# Patient Record
Sex: MALE | Race: WHITE | NOT HISPANIC OR LATINO | Employment: OTHER | URBAN - METROPOLITAN AREA
[De-identification: names, ages, dates, MRNs, and addresses within clinical notes are randomized per-mention and may not be internally consistent; named-entity substitution may affect disease eponyms.]

---

## 2020-02-18 ASSESSMENT — MIFFLIN-ST. JEOR: SCORE: 1483.71

## 2020-02-20 ENCOUNTER — ANESTHESIA - HEALTHEAST (OUTPATIENT)
Dept: SURGERY | Facility: AMBULATORY SURGERY CENTER | Age: 80
End: 2020-02-20

## 2020-02-21 ENCOUNTER — SURGERY - HEALTHEAST (OUTPATIENT)
Dept: SURGERY | Facility: AMBULATORY SURGERY CENTER | Age: 80
End: 2020-02-21

## 2020-02-21 ASSESSMENT — MIFFLIN-ST. JEOR: SCORE: 1483.71

## 2021-06-04 VITALS — HEIGHT: 67 IN | WEIGHT: 183 LBS | BODY MASS INDEX: 28.72 KG/M2

## 2021-06-06 NOTE — ANESTHESIA CARE TRANSFER NOTE
Last vitals:   Vitals:    02/21/20 0940   BP:    Pulse: (!) (P) 58   Resp: (P) 14   Temp: (P) 37.4  C (99.4  F)   SpO2: (P) 99%     /68  Pt brought to phase 2 on 6L O2. Monitors applied. VSS.    Patient's level of consciousness is drowsy  Spontaneous respirations: yes  Maintains airway independently: yes  Dentition unchanged: yes  Oropharynx: oropharynx clear of all foreign objects    QCDR Measures:  ASA# 20 - Surgical Safety Checklist: WHO surgical safety checklist completed prior to induction    PQRS# 430 - Adult PONV Prevention: NA - Not adult patient, not GA or 3 or more risk factors NOT present  ASA# 8 - Peds PONV Prevention: NA - Not pediatric patient, not GA or 2 or more risk factors NOT present  PQRS# 424 - Tabitha-op Temp Management: NA - MAC anesthesia or case < 60 minutes  PQRS# 426 - PACU Transfer Protocol: - Transfer of care checklist used  ASA# 14 - Acute Post-op Pain: ASA14B - Patient did NOT experience pain >= 7 out of 10

## 2021-06-06 NOTE — ANESTHESIA PREPROCEDURE EVALUATION
Anesthesia Evaluation      Patient summary reviewed   No history of anesthetic complications     Airway   Mallampati: II  Neck ROM: full   Pulmonary - normal exam    breath sounds clear to auscultation  (+) sleep apnea on CPAP, , a smoker (remote)                         Cardiovascular - normal exam  Exercise tolerance: > or = 4 METS  (+) hypertension well controlled, ,      Neuro/Psych - negative ROS     Endo/Other    (+) diabetes mellitus (no meds, A1: 6.4) type 2 well controlled,      GI/Hepatic/Renal    (+)   chronic renal disease (nephrolithiasis),           Dental - normal exam   (+) caps                       Anesthesia Plan  Planned anesthetic: MAC  Propofol infusion  Patient interested in watching colonoscopy, as he has in the past.     ASA 3   Induction: intravenous   Anesthetic plan and risks discussed with: patient, spouse and sibling  Anesthesia plan special considerations: antiemetics,   Post-op plan: routine recovery      Results for orders placed or performed during the hospital encounter of 02/21/20   Bedside Glucose POCT   Result Value Ref Range    Glucose,  (!) 70 - 125 mg/dL     Hgb 16.3  K 3.9  Cr 1.17

## 2021-06-06 NOTE — ANESTHESIA POSTPROCEDURE EVALUATION
Patient: Dada Mccoy  Procedure(s):  COLONOSCOPY  Anesthesia type: MAC    Patient location: Phase II Recovery  Last vitals:   Vitals Value Taken Time   /61 2/21/2020 10:03 AM   Temp  2/21/2020 10:16 AM   Pulse 78 2/21/2020 10:13 AM   Resp 16 2/21/2020 10:00 AM   SpO2 95 % 2/21/2020 10:13 AM   Vitals shown include unvalidated device data.  Post vital signs: stable  Level of consciousness: awake and responds to simple questions  Post-anesthesia pain: pain controlled  Post-anesthesia nausea and vomiting: no  Pulmonary: unassisted, return to baseline  Cardiovascular: stable and blood pressure at baseline  Hydration: adequate  Anesthetic events: no    QCDR Measures:  ASA# 11 - Tabitha-op Cardiac Arrest: ASA11B - Patient did NOT experience unanticipated cardiac arrest  ASA# 12 - Tabitha-op Mortality Rate: ASA12B - Patient did NOT die  ASA# 13 - PACU Re-Intubation Rate: NA - No ETT / LMA used for case  ASA# 10 - Composite Anes Safety: ASA10A - No serious adverse event    Additional Notes:

## 2021-08-16 ENCOUNTER — HOSPITAL ENCOUNTER (OUTPATIENT)
Facility: CLINIC | Age: 81
End: 2021-08-16
Attending: INTERNAL MEDICINE | Admitting: INTERNAL MEDICINE

## 2021-08-21 ENCOUNTER — HEALTH MAINTENANCE LETTER (OUTPATIENT)
Age: 81
End: 2021-08-21

## 2021-10-01 DIAGNOSIS — Z11.59 ENCOUNTER FOR SCREENING FOR OTHER VIRAL DISEASES: ICD-10-CM

## 2021-10-16 ENCOUNTER — HEALTH MAINTENANCE LETTER (OUTPATIENT)
Age: 81
End: 2021-10-16

## 2022-10-01 ENCOUNTER — HEALTH MAINTENANCE LETTER (OUTPATIENT)
Age: 82
End: 2022-10-01

## 2023-10-15 ENCOUNTER — HEALTH MAINTENANCE LETTER (OUTPATIENT)
Age: 83
End: 2023-10-15

## 2024-01-01 ENCOUNTER — APPOINTMENT (OUTPATIENT)
Dept: CT IMAGING | Facility: HOSPITAL | Age: 84
End: 2024-01-01
Attending: EMERGENCY MEDICINE
Payer: COMMERCIAL

## 2024-01-01 ENCOUNTER — HOSPITAL ENCOUNTER (EMERGENCY)
Facility: HOSPITAL | Age: 84
Discharge: HOME | End: 2024-01-01
Attending: EMERGENCY MEDICINE | Admitting: EMERGENCY MEDICINE
Payer: COMMERCIAL

## 2024-01-01 VITALS
HEART RATE: 89 BPM | WEIGHT: 163 LBS | BODY MASS INDEX: 25.58 KG/M2 | DIASTOLIC BLOOD PRESSURE: 67 MMHG | SYSTOLIC BLOOD PRESSURE: 133 MMHG | TEMPERATURE: 97.7 F | OXYGEN SATURATION: 98 % | RESPIRATION RATE: 16 BRPM | HEIGHT: 67 IN

## 2024-01-01 DIAGNOSIS — K59.00 CONSTIPATION, UNSPECIFIED CONSTIPATION TYPE: ICD-10-CM

## 2024-01-01 DIAGNOSIS — R10.32 LEFT LOWER QUADRANT ABDOMINAL PAIN: ICD-10-CM

## 2024-01-01 LAB
ALBUMIN SERPL BCG-MCNC: 4.2 G/DL (ref 3.5–5.2)
ALBUMIN UR-MCNC: NEGATIVE MG/DL
ALP SERPL-CCNC: 66 U/L (ref 40–150)
ALT SERPL W P-5'-P-CCNC: 12 U/L (ref 0–70)
ANION GAP SERPL CALCULATED.3IONS-SCNC: 9 MMOL/L (ref 7–15)
APPEARANCE UR: CLEAR
AST SERPL W P-5'-P-CCNC: 16 U/L (ref 0–45)
BASOPHILS # BLD AUTO: 0 10E3/UL (ref 0–0.2)
BASOPHILS NFR BLD AUTO: 0 %
BILIRUB SERPL-MCNC: 0.2 MG/DL
BILIRUB UR QL STRIP: NEGATIVE
BUN SERPL-MCNC: 17.9 MG/DL (ref 8–23)
CALCIUM SERPL-MCNC: 8.6 MG/DL (ref 8.8–10.2)
CHLORIDE SERPL-SCNC: 103 MMOL/L (ref 98–107)
COLOR UR AUTO: COLORLESS
CREAT SERPL-MCNC: 0.83 MG/DL (ref 0.67–1.17)
DEPRECATED HCO3 PLAS-SCNC: 29 MMOL/L (ref 22–29)
EGFRCR SERPLBLD CKD-EPI 2021: 86 ML/MIN/1.73M2
EOSINOPHIL # BLD AUTO: 0.1 10E3/UL (ref 0–0.7)
EOSINOPHIL NFR BLD AUTO: 1 %
ERYTHROCYTE [DISTWIDTH] IN BLOOD BY AUTOMATED COUNT: 12.4 % (ref 10–15)
GLUCOSE SERPL-MCNC: 159 MG/DL (ref 70–99)
GLUCOSE UR STRIP-MCNC: NEGATIVE MG/DL
HCT VFR BLD AUTO: 43.9 % (ref 40–53)
HGB BLD-MCNC: 14.8 G/DL (ref 13.3–17.7)
HGB UR QL STRIP: NEGATIVE
IMM GRANULOCYTES # BLD: 0 10E3/UL
IMM GRANULOCYTES NFR BLD: 1 %
KETONES UR STRIP-MCNC: 20 MG/DL
LEUKOCYTE ESTERASE UR QL STRIP: NEGATIVE
LYMPHOCYTES # BLD AUTO: 0.7 10E3/UL (ref 0.8–5.3)
LYMPHOCYTES NFR BLD AUTO: 12 %
MCH RBC QN AUTO: 30.8 PG (ref 26.5–33)
MCHC RBC AUTO-ENTMCNC: 33.7 G/DL (ref 31.5–36.5)
MCV RBC AUTO: 91 FL (ref 78–100)
MONOCYTES # BLD AUTO: 0.5 10E3/UL (ref 0–1.3)
MONOCYTES NFR BLD AUTO: 8 %
MUCOUS THREADS #/AREA URNS LPF: PRESENT /LPF
NEUTROPHILS # BLD AUTO: 5.1 10E3/UL (ref 1.6–8.3)
NEUTROPHILS NFR BLD AUTO: 78 %
NITRATE UR QL: NEGATIVE
NRBC # BLD AUTO: 0 10E3/UL
NRBC BLD AUTO-RTO: 0 /100
PH UR STRIP: 6.5 [PH] (ref 5–7)
PLATELET # BLD AUTO: 228 10E3/UL (ref 150–450)
POTASSIUM SERPL-SCNC: 3.5 MMOL/L (ref 3.4–5.3)
PROT SERPL-MCNC: 6.6 G/DL (ref 6.4–8.3)
RBC # BLD AUTO: 4.81 10E6/UL (ref 4.4–5.9)
RBC URINE: <1 /HPF
SODIUM SERPL-SCNC: 141 MMOL/L (ref 135–145)
SP GR UR STRIP: 1.02 (ref 1–1.03)
SQUAMOUS EPITHELIAL: <1 /HPF
UROBILINOGEN UR STRIP-MCNC: <2 MG/DL
WBC # BLD AUTO: 6.4 10E3/UL (ref 4–11)
WBC URINE: 0 /HPF

## 2024-01-01 PROCEDURE — 74177 CT ABD & PELVIS W/CONTRAST: CPT | Mod: MA

## 2024-01-01 PROCEDURE — 36415 COLL VENOUS BLD VENIPUNCTURE: CPT | Performed by: EMERGENCY MEDICINE

## 2024-01-01 PROCEDURE — 250N000013 HC RX MED GY IP 250 OP 250 PS 637: Performed by: EMERGENCY MEDICINE

## 2024-01-01 PROCEDURE — 99285 EMERGENCY DEPT VISIT HI MDM: CPT | Mod: 25

## 2024-01-01 PROCEDURE — 85025 COMPLETE CBC W/AUTO DIFF WBC: CPT | Performed by: EMERGENCY MEDICINE

## 2024-01-01 PROCEDURE — 80053 COMPREHEN METABOLIC PANEL: CPT | Performed by: EMERGENCY MEDICINE

## 2024-01-01 PROCEDURE — 250N000011 HC RX IP 250 OP 636: Performed by: EMERGENCY MEDICINE

## 2024-01-01 PROCEDURE — 81001 URINALYSIS AUTO W/SCOPE: CPT | Performed by: EMERGENCY MEDICINE

## 2024-01-01 RX ORDER — IOPAMIDOL 755 MG/ML
80 INJECTION, SOLUTION INTRAVASCULAR ONCE
Status: COMPLETED | OUTPATIENT
Start: 2024-01-01 | End: 2024-01-01

## 2024-01-01 RX ORDER — ACETAMINOPHEN 325 MG/1
975 TABLET ORAL ONCE
Status: COMPLETED | OUTPATIENT
Start: 2024-01-01 | End: 2024-01-01

## 2024-01-01 RX ADMIN — ACETAMINOPHEN 975 MG: 325 TABLET ORAL at 09:18

## 2024-01-01 RX ADMIN — MINERAL OIL 226 ML: 1000 LIQUID ORAL at 09:46

## 2024-01-01 RX ADMIN — IOPAMIDOL 80 ML: 755 INJECTION, SOLUTION INTRAVENOUS at 08:21

## 2024-01-01 ASSESSMENT — ENCOUNTER SYMPTOMS
DYSURIA: 0
COUGH: 0
DIFFICULTY URINATING: 0
DIARRHEA: 0
NAUSEA: 0
WEAKNESS: 0
SHORTNESS OF BREATH: 0
CONSTIPATION: 0
FEVER: 0
VOMITING: 0
FATIGUE: 0
HEADACHES: 0
ABDOMINAL PAIN: 1
CHILLS: 0

## 2024-01-01 ASSESSMENT — ACTIVITIES OF DAILY LIVING (ADL)
ADLS_ACUITY_SCORE: 35
ADLS_ACUITY_SCORE: 35

## 2024-01-01 NOTE — ED PROVIDER NOTES
EMERGENCY DEPARTMENT ENCOUNTER      NAME: Dada Mccoy  AGE: 83 year old male  YOB: 1940  MRN: [id number]  EVALUATION DATE & TIME: 1/1/2024  6:57 AM    PCP: No primary care provider on file.    ED PROVIDER: Jerri Del Toro DO      Chief Complaint   Patient presents with    Abdominal Pain         FINAL IMPRESSION:  1. Left lower quadrant abdominal pain    2. Constipation, unspecified constipation type          ED COURSE & MEDICAL DECISION MAKING:    Pertinent Labs & Imaging studies reviewed. (See chart for details)  7:05 AM I met the patient and performed my initial interview and exam.  9:10 AM Rechecked and updated the patient. His abdominal pain is now more centralized. His repeat abdominal exam is benign. He also reports he has been having trouble with bowel movements recently. He took MiraLAX yesterday and had a large bowel movement, but hasn't taken any MiraLAX yet today.   9:20 AM Offered patient an enema, he would like this.  10:43 AM Update from ED RN. Patient able to have a bowel movement after enema. Feeling improved and feels ready to go home. We discussed the plan for discharge and the patient is agreeable. Reviewed supportive cares, symptomatic treatment, outpatient follow up, and reasons to return to the Emergency Department. Patient to be discharged by ED RN.     83 year old male presents to the Emergency Department for evaluation of left lower quadrant abdominal pain.    MEDICAL DECISION MAKING: I was concerned about this patient's LOWER ABDOMINAL pain and considered multiple causes including but not limited to the following.        Intestinal Ischemia:  Patient does not have significant risk factors for intestinal ischemia.  Pain is not out of proportion to exam findings.  Transverse colitis / Diverticulitis: Patient does not have diarrhea or fevers.   Appendicitis: Patient does not have RLQ TTP.    Pyelonephritis: Urine is not consistent with infection  Ureterolithiasis/Renal  Colic:  Urine does not show blood.   Bowel Obstruction:  Patient is  passing gas. Bowel sounds are  normal.   Others benign causes including: IBS, intestinal colic / gas pains, etc      IMPRESSION: Based on this patient's history, exam, and diagnostic results, the working diagnosis of this patient's abdominal pain is likely due to large amount of stool in colon and rectum.        DISPOSITION PLAN:    Discharge. Patient is appropriate for outpatient management with medications per discharge instructions.  He was given an enema today and he had a bowel movement.  Patient was given verbal and written discharge instructions for follow up as well as indications for return to the Emergency Department.     At the conclusion of the encounter I discussed the results of all of the tests and the disposition. The questions were answered. The patient or family acknowledged understanding and was agreeable with the care plan.     Medical Decision Making    History:  Supplemental history from: N/A  External Record(s) reviewed: Outpatient Record: Internal Medicine Visit 12/11/23    Work Up:  Chart documentation includes differential considered and any EKGs or imaging independently interpreted by provider, where specified.  In additional to work up documented, I considered the following work up: Documented in chart, if applicable.    External consultation:  Discussion of management with another provider: Documented in chart, if applicable    Complicating factors:  Care impacted by chronic illness: Diabetes and Hypertension  Care affected by social determinants of health: N/A    Disposition considerations: Discharge. No recommendations on prescription strength medication(s). See documentation for any additional details.    MEDICATIONS GIVEN IN THE EMERGENCY:  Medications   iopamidol (ISOVUE-370) solution 80 mL (80 mLs Intravenous $Given 1/1/24 6825)   acetaminophen (TYLENOL) tablet 975 mg (975 mg Oral $Given 1/1/24 0903)   Enema  Compound (docusate/mineral oil/NaPhos) NO MAG CIT PREMIX (226 mLs Rectal $Given 1/1/24 6788)       NEW PRESCRIPTIONS STARTED AT TODAY'S ER VISIT  New Prescriptions    No medications on file          =================================================================    HPI    Patient information was obtained from: Patient    Use of : N/A      Dada Mccoy is a 83 year old male with a pertinent history of HTN, diabetes mellitus type II, kidney stones, and colon cancer s/p partial resection (2007) who presents to this ED via EMS for evaluation of abdominal pain.    The patient is coming from home where he lives independently. He woke up a little before 0500 this morning. Shortly after waking up he had onset of LLQ abdominal pain. The pain has been constant since onset with waxing and waning intensity. The pain is non-radiating. He denies any history of similar pain. He was able to have a small bowel movement this morning without difficulty. He has not had any nausea, vomiting, testicular pain, diarrhea, difficulty urinating, or dysuria with the pain. He notes that he ate florencia crab for the first time last night. He denies any chest pain or shortness of breath.    The patient reports a prior history of kidney stones x4. He also previously had colon cancer and had 4 cm of his colon resected in 2007. His last colonoscopy 1-2 years ago looked good per his report.       REVIEW OF SYSTEMS   Review of Systems   Constitutional:  Negative for chills, fatigue and fever.   Respiratory:  Negative for cough and shortness of breath.    Cardiovascular:  Negative for chest pain.   Gastrointestinal:  Positive for abdominal pain (LLQ). Negative for constipation, diarrhea, nausea and vomiting.   Genitourinary:  Negative for difficulty urinating, dysuria and testicular pain.   Skin: Negative.    Neurological:  Negative for syncope, weakness and headaches.       PAST MEDICAL HISTORY:  Past Medical History:   Diagnosis Date  "   Diabetes mellitus (H)     Type 2    Hypertension     Sleep apnea     CPAP       PAST SURGICAL HISTORY:  Past Surgical History:   Procedure Laterality Date    COLON SURGERY      COLONOSCOPY      ZZHC COLONOSCOPY W/WO BRUSH/WASH N/A 2/21/2020    Procedure: COLONOSCOPY;  Surgeon: Juan Bruno MD;  Location: [facility];  Service: Gastroenterology           CURRENT MEDICATIONS:    amLODIPine (NORVASC) 10 MG tablet  gemfibroziL (LOPID) 600 MG tablet  glucosamine-chondroitin (OSTEO BI-FLEX) 250-200 mg Tab  hydroCHLOROthiazide (HYDRODIURIL) 25 MG tablet  potassium chloride (KLOR-CON) 20 mEq packet         ALLERGIES:  Allergies   Allergen Reactions    Tramadol Palpitations     Rapid Heart Beat, Lobito Blood Pressure    Statins-Hmg-Coa Reductase Inhibitors [Statins] Muscle Pain (Myalgia)       FAMILY HISTORY:  History reviewed. No pertinent family history.    SOCIAL HISTORY:   Social History     Socioeconomic History    Marital status:    Tobacco Use    Smoking status: Former    Smokeless tobacco: Never   Substance and Sexual Activity    Alcohol use: Yes    Drug use: Never       VITALS:  BP (!) 151/73   Pulse 75   Temp 97.7  F (36.5  C) (Oral)   Resp 16   Ht 1.695 m (5' 6.75\")   Wt 73.9 kg (163 lb)   SpO2 97%   BMI 25.72 kg/m      PHYSICAL EXAM    Physical Exam  Constitutional:       General: He is not in acute distress.  HENT:      Head: Normocephalic and atraumatic.      Mouth/Throat:      Pharynx: Oropharynx is clear.   Eyes:      Pupils: Pupils are equal, round, and reactive to light.   Cardiovascular:      Rate and Rhythm: Normal rate and regular rhythm.      Pulses: Normal pulses.      Heart sounds: Normal heart sounds.   Pulmonary:      Effort: Pulmonary effort is normal.      Breath sounds: Normal breath sounds.   Abdominal:      General: Abdomen is flat. Bowel sounds are normal.      Palpations: Abdomen is soft.      Tenderness: There is abdominal tenderness in the left lower quadrant. There " is no guarding or rebound.   Musculoskeletal:         General: Normal range of motion.   Skin:     General: Skin is warm and dry.      Capillary Refill: Capillary refill takes less than 2 seconds.   Neurological:      General: No focal deficit present.      Mental Status: He is alert and oriented to person, place, and time.             LAB:  All pertinent labs reviewed and interpreted.  Labs Ordered and Resulted from Time of ED Arrival to Time of ED Departure   COMPREHENSIVE METABOLIC PANEL - Abnormal       Result Value    Sodium 141      Potassium 3.5      Carbon Dioxide (CO2) 29      Anion Gap 9      Urea Nitrogen 17.9      Creatinine 0.83      GFR Estimate 86      Calcium 8.6 (*)     Chloride 103      Glucose 159 (*)     Alkaline Phosphatase 66      AST 16      ALT 12      Protein Total 6.6      Albumin 4.2      Bilirubin Total 0.2     ROUTINE UA WITH MICROSCOPIC REFLEX TO CULTURE - Abnormal    Color Urine Colorless      Appearance Urine Clear      Glucose Urine Negative      Bilirubin Urine Negative      Ketones Urine 20 (*)     Specific Gravity Urine 1.017      Blood Urine Negative      pH Urine 6.5      Protein Albumin Urine Negative      Urobilinogen Urine <2.0      Nitrite Urine Negative      Leukocyte Esterase Urine Negative      Mucus Urine Present (*)     RBC Urine <1      WBC Urine 0      Squamous Epithelials Urine <1     CBC WITH PLATELETS AND DIFFERENTIAL - Abnormal    WBC Count 6.4      RBC Count 4.81      Hemoglobin 14.8      Hematocrit 43.9      MCV 91      MCH 30.8      MCHC 33.7      RDW 12.4      Platelet Count 228      % Neutrophils 78      % Lymphocytes 12      % Monocytes 8      % Eosinophils 1      % Basophils 0      % Immature Granulocytes 1      NRBCs per 100 WBC 0      Absolute Neutrophils 5.1      Absolute Lymphocytes 0.7 (*)     Absolute Monocytes 0.5      Absolute Eosinophils 0.1      Absolute Basophils 0.0      Absolute Immature Granulocytes 0.0      Absolute NRBCs 0.0          RADIOLOGY:  Reviewed all pertinent imaging. Please see official radiology report.  CT Abdomen Pelvis w Contrast   Final Result   IMPRESSION:    1.  Colonic diverticulosis without convincing diverticulitis. Moderate stool in the colon and rectum.   2.  Moderately enlarged prostate and mild smooth asymmetric left urinary bladder wall thickening, possibly sequelae of chronic outlet obstruction.   3.  Mildly enlarged right mesorectal lymph node, indeterminate and possibly reactive. Consider short-term follow-up.                              IMesfin, am serving as a scribe to document services personally performed by Dr. Jerri DelT oro based on my observation and the provider's statements to me. Jerri FIELDS, DO attest that Mesfin Whitney is acting in a scribe capacity, has observed my performance of the services and has documented them in accordance with my direction.    Jerri Del Toro DO  Emergency Medicine  [facility] EMERGENCY DEPARTMENT  [address] [place] [id number]  [phone]  Dept: [phone]     Jerri Del Toro DO  01/01/24 1045

## 2024-01-01 NOTE — DISCHARGE INSTRUCTIONS
As discussed, your blood work is reassuring today  You do have a large amount of stool in your colon and rectum which could be contributing to your symptoms.  You were given an enema today to help relieve your constipation  If ongoing symptoms, I would continue MiraLAX daily  Continue her Metamucil  If pain becomes uncontrolled, high fever, inability to tolerate anything by mouth or have a bowel movement, please return to the emergency room  As noted, there is an incidental and lymph node noted on your CT imaging, please follow closely with your doctor for monitoring of this

## 2024-01-01 NOTE — ED TRIAGE NOTES
Patient arrives from home via [facility]. Reports 5-6/10 LLQ pain that started around 0500. Patient reports he had a small bowel movement this morning that was normal in color. Ate florencia crab for the first time last night and not sure if that is contributing to pain.     Triage Assessment (Adult)       Row Name 01/01/24 0700          Triage Assessment    Airway WDL WDL        Respiratory WDL    Respiratory WDL WDL        Skin Circulation/Temperature WDL    Skin Circulation/Temperature WDL WDL        Cognitive/Neuro/Behavioral WDL    Cognitive/Neuro/Behavioral WDL WDL

## 2024-01-01 NOTE — ED NOTES
Bed: JNED-21  Expected date: 1/1/24  Expected time: 6:45 AM  Means of arrival: Ambulance  Comments:  Neva  84 yo M abd pain

## 2025-01-18 ENCOUNTER — HEALTH MAINTENANCE LETTER (OUTPATIENT)
Age: 85
End: 2025-01-18